# Patient Record
Sex: FEMALE | Race: WHITE | NOT HISPANIC OR LATINO | URBAN - METROPOLITAN AREA
[De-identification: names, ages, dates, MRNs, and addresses within clinical notes are randomized per-mention and may not be internally consistent; named-entity substitution may affect disease eponyms.]

---

## 2017-10-03 ENCOUNTER — IMPORTED ENCOUNTER (OUTPATIENT)
Dept: URBAN - METROPOLITAN AREA CLINIC 50 | Facility: CLINIC | Age: 67
End: 2017-10-03

## 2017-10-09 ENCOUNTER — IMPORTED ENCOUNTER (OUTPATIENT)
Dept: URBAN - METROPOLITAN AREA CLINIC 50 | Facility: CLINIC | Age: 67
End: 2017-10-09

## 2018-11-26 ENCOUNTER — IMPORTED ENCOUNTER (OUTPATIENT)
Dept: URBAN - METROPOLITAN AREA CLINIC 50 | Facility: CLINIC | Age: 68
End: 2018-11-26

## 2019-11-25 ENCOUNTER — IMPORTED ENCOUNTER (OUTPATIENT)
Dept: URBAN - METROPOLITAN AREA CLINIC 50 | Facility: CLINIC | Age: 69
End: 2019-11-25

## 2020-06-03 NOTE — PATIENT DISCUSSION
Counseling: Not visually significant to proceed with surgery at this time. I have discussed continuing with current spectacles vs updating. I have offered the patient a new spectacle prescription to fill if desires. Return to follow up as scheduled or sooner if symptoms arise.

## 2020-12-02 ENCOUNTER — IMPORTED ENCOUNTER (OUTPATIENT)
Dept: URBAN - METROPOLITAN AREA CLINIC 50 | Facility: CLINIC | Age: 70
End: 2020-12-02

## 2021-04-18 ASSESSMENT — VISUAL ACUITY
OD_BAT: 20/50
OD_BAT: 20/40
OS_CC: 20/30-
OS_CC: J1+
OS_CC: 20/40-2
OD_CC: J1@ 16 IN
OD_CC: J1+
OD_CC: J1+
OD_OTHER: 20/50. 20/200.
OD_CC: 20/30+2
OS_BAT: 20/50
OS_OTHER: 20/40. 20/50.
OD_CC: 20/30+1
OS_CC: 20/50
OS_PH: 20/25
OD_OTHER: 20/40. 20/80.
OS_OTHER: 20/50. 20/60.
OS_CC: 20/30+1
OD_OTHER: 20/40. 20/50.
OD_CC: J1+LAP
OS_BAT: 20/50
OS_PH: 20/40
OS_CC: J1@ 16 IN
OS_PH: 20/30
OD_PH: 20/30-
OD_CC: 20/30
OD_PH: 20/30
OD_CC: 20/40
OD_BAT: 20/30
OD_PH: 20/30
OS_CC: J1+
OD_BAT: 20/40
OS_CC: J1+LAP
OD_CC: 20/40+2
OS_OTHER: 20/50. 20/80.
OD_OTHER: 20/30. 20/60.
OS_BAT: 20/40

## 2021-04-18 ASSESSMENT — TONOMETRY
OS_IOP_MMHG: 15
OD_IOP_MMHG: 14
OD_IOP_MMHG: 13
OD_IOP_MMHG: 16
OS_IOP_MMHG: 14
OS_IOP_MMHG: 17
OD_IOP_MMHG: 15
OS_IOP_MMHG: 14

## 2021-12-01 ENCOUNTER — PREPPED CHART (OUTPATIENT)
Dept: URBAN - METROPOLITAN AREA CLINIC 53 | Facility: CLINIC | Age: 71
End: 2021-12-01

## 2021-12-06 ENCOUNTER — COMPREHENSIVE EXAM (OUTPATIENT)
Dept: URBAN - METROPOLITAN AREA CLINIC 49 | Facility: CLINIC | Age: 71
End: 2021-12-06

## 2021-12-06 DIAGNOSIS — H35.362: ICD-10-CM

## 2021-12-06 DIAGNOSIS — H43.812: ICD-10-CM

## 2021-12-06 DIAGNOSIS — H26.491: ICD-10-CM

## 2021-12-06 DIAGNOSIS — H57.813: ICD-10-CM

## 2021-12-06 PROCEDURE — 92014 COMPRE OPH EXAM EST PT 1/>: CPT

## 2021-12-06 ASSESSMENT — VISUAL ACUITY
OD_PH: 20/30+2
OS_PH: 20/30+2
OU_CC: J1+
OS_CC: 20/40-2
OD_CC: 20/30

## 2021-12-06 ASSESSMENT — TONOMETRY
OS_IOP_MMHG: 20
OD_IOP_MMHG: 19

## 2022-03-27 NOTE — PATIENT DISCUSSION
MILD DRY EYES: PRESCRIBED OTC ARTIFICIAL TEARS BID - QID, OU RETURN FOR FOLLOW-UP AS SCHEDULED OR SOONER IF SYMPTOMS WORSEN. show

## 2022-12-19 ENCOUNTER — COMPREHENSIVE EXAM (OUTPATIENT)
Dept: URBAN - METROPOLITAN AREA CLINIC 49 | Facility: CLINIC | Age: 72
End: 2022-12-19

## 2022-12-19 PROCEDURE — 92134 CPTRZ OPH DX IMG PST SGM RTA: CPT

## 2022-12-19 PROCEDURE — 92014 COMPRE OPH EXAM EST PT 1/>: CPT

## 2022-12-19 ASSESSMENT — VISUAL ACUITY
OD_GLARE: 20/25
OU_CC: 20/25-2
OD_CC: 20/30
OS_GLARE: 20/100
OU_CC: J1 @ 17IN
OD_GLARE: 20/25
OS_CC: 20/40
OS_GLARE: >20/400

## 2022-12-19 ASSESSMENT — TONOMETRY
OD_IOP_MMHG: 13
OS_IOP_MMHG: 18

## 2023-12-20 ENCOUNTER — COMPREHENSIVE EXAM (OUTPATIENT)
Dept: URBAN - METROPOLITAN AREA CLINIC 49 | Facility: LOCATION | Age: 73
End: 2023-12-20

## 2023-12-20 DIAGNOSIS — H43.812: ICD-10-CM

## 2023-12-20 DIAGNOSIS — H35.372: ICD-10-CM

## 2023-12-20 DIAGNOSIS — H35.363: ICD-10-CM

## 2023-12-20 DIAGNOSIS — H26.493: ICD-10-CM

## 2023-12-20 PROCEDURE — 99214 OFFICE O/P EST MOD 30 MIN: CPT

## 2023-12-20 PROCEDURE — 92015 DETERMINE REFRACTIVE STATE: CPT

## 2023-12-20 PROCEDURE — 92134 CPTRZ OPH DX IMG PST SGM RTA: CPT

## 2023-12-20 ASSESSMENT — VISUAL ACUITY
OS_GLARE: 20/25-1
OS_GLARE: 20/25
OU_CC: J1+@14"
OS_PH: 20/25-1
OD_CC: 20/30
OS_CC: 20/30
OD_GLARE: 20/25
OD_PH: 20/25
OD_GLARE: 20/25-1

## 2023-12-20 ASSESSMENT — TONOMETRY
OS_IOP_MMHG: 20
OD_IOP_MMHG: 18

## 2025-01-02 ENCOUNTER — COMPREHENSIVE EXAM (OUTPATIENT)
Age: 75
End: 2025-01-02

## 2025-01-02 DIAGNOSIS — H43.812: ICD-10-CM

## 2025-01-02 DIAGNOSIS — H26.493: ICD-10-CM

## 2025-01-02 DIAGNOSIS — H40.053: ICD-10-CM

## 2025-01-02 DIAGNOSIS — H35.363: ICD-10-CM

## 2025-01-02 DIAGNOSIS — H04.123: ICD-10-CM

## 2025-01-02 PROCEDURE — 99214 OFFICE O/P EST MOD 30 MIN: CPT

## 2025-01-02 PROCEDURE — 76514 ECHO EXAM OF EYE THICKNESS: CPT

## 2025-01-02 PROCEDURE — 92134 CPTRZ OPH DX IMG PST SGM RTA: CPT

## 2025-05-14 ENCOUNTER — FOLLOW UP (OUTPATIENT)
Age: 75
End: 2025-05-14

## 2025-05-14 DIAGNOSIS — H04.123: ICD-10-CM

## 2025-05-14 DIAGNOSIS — H26.493: ICD-10-CM

## 2025-05-14 DIAGNOSIS — H40.051: ICD-10-CM

## 2025-05-14 DIAGNOSIS — H40.1121: ICD-10-CM

## 2025-05-14 PROCEDURE — 99214 OFFICE O/P EST MOD 30 MIN: CPT

## 2025-05-14 PROCEDURE — 92133 CPTRZD OPH DX IMG PST SGM ON: CPT

## 2025-05-14 PROCEDURE — 92083 EXTENDED VISUAL FIELD XM: CPT

## 2025-05-14 RX ORDER — BIMATOPROST 0.1 MG/ML
1 SOLUTION/ DROPS OPHTHALMIC EVERY EVENING
Start: 2025-05-14

## 2025-06-11 ENCOUNTER — FOLLOW UP (OUTPATIENT)
Age: 75
End: 2025-06-11

## 2025-06-11 DIAGNOSIS — H40.051: ICD-10-CM

## 2025-06-11 DIAGNOSIS — H04.123: ICD-10-CM

## 2025-06-11 DIAGNOSIS — H40.1121: ICD-10-CM

## 2025-06-11 PROCEDURE — 99213 OFFICE O/P EST LOW 20 MIN: CPT
